# Patient Record
Sex: MALE | Race: WHITE | NOT HISPANIC OR LATINO | Employment: UNEMPLOYED | URBAN - NONMETROPOLITAN AREA
[De-identification: names, ages, dates, MRNs, and addresses within clinical notes are randomized per-mention and may not be internally consistent; named-entity substitution may affect disease eponyms.]

---

## 2023-04-16 ENCOUNTER — APPOINTMENT (OUTPATIENT)
Dept: GENERAL RADIOLOGY | Facility: HOSPITAL | Age: 68
End: 2023-04-16
Payer: MEDICARE

## 2023-04-16 ENCOUNTER — HOSPITAL ENCOUNTER (EMERGENCY)
Facility: HOSPITAL | Age: 68
Discharge: HOME | End: 2023-04-16
Attending: EMERGENCY MEDICINE | Admitting: EMERGENCY MEDICINE
Payer: MEDICARE

## 2023-04-16 VITALS
TEMPERATURE: 98.2 F | WEIGHT: 180 LBS | HEART RATE: 78 BPM | HEIGHT: 67 IN | OXYGEN SATURATION: 100 % | RESPIRATION RATE: 18 BRPM | SYSTOLIC BLOOD PRESSURE: 127 MMHG | DIASTOLIC BLOOD PRESSURE: 74 MMHG | BODY MASS INDEX: 28.25 KG/M2

## 2023-04-16 DIAGNOSIS — L03.119 CELLULITIS OF FOOT: Primary | ICD-10-CM

## 2023-04-16 DIAGNOSIS — R21 GROIN RASH: ICD-10-CM

## 2023-04-16 LAB
ALBUMIN SERPL-MCNC: 4.3 G/DL (ref 3.5–5.2)
ALBUMIN/GLOB SERPL: 1.5 G/DL
ALP SERPL-CCNC: 119 U/L (ref 39–117)
ALT SERPL W P-5'-P-CCNC: 24 U/L (ref 1–41)
ANION GAP SERPL CALCULATED.3IONS-SCNC: 11.1 MMOL/L (ref 5–15)
AST SERPL-CCNC: 22 U/L (ref 1–40)
BASOPHILS # BLD AUTO: 0.08 10*3/MM3 (ref 0–0.2)
BASOPHILS NFR BLD AUTO: 1.1 % (ref 0–1.5)
BILIRUB SERPL-MCNC: 0.2 MG/DL (ref 0–1.2)
BUN SERPL-MCNC: 10 MG/DL (ref 8–23)
BUN/CREAT SERPL: 8.4 (ref 7–25)
CALCIUM SPEC-SCNC: 9.7 MG/DL (ref 8.6–10.5)
CHLORIDE SERPL-SCNC: 106 MMOL/L (ref 98–107)
CO2 SERPL-SCNC: 25.9 MMOL/L (ref 22–29)
CREAT SERPL-MCNC: 1.19 MG/DL (ref 0.76–1.27)
D DIMER PPP FEU-MCNC: 0.4 MCGFEU/ML (ref 0–0.65)
DEPRECATED RDW RBC AUTO: 40.9 FL (ref 37–54)
EGFRCR SERPLBLD CKD-EPI 2021: 67.8 ML/MIN/1.73
EOSINOPHIL # BLD AUTO: 0.32 10*3/MM3 (ref 0–0.4)
EOSINOPHIL NFR BLD AUTO: 4.5 % (ref 0.3–6.2)
ERYTHROCYTE [DISTWIDTH] IN BLOOD BY AUTOMATED COUNT: 13.1 % (ref 12.3–15.4)
GEN 5 2HR TROPONIN T REFLEX: 6 NG/L
GLOBULIN UR ELPH-MCNC: 2.9 GM/DL
GLUCOSE SERPL-MCNC: 119 MG/DL (ref 65–99)
HCT VFR BLD AUTO: 46.9 % (ref 37.5–51)
HGB BLD-MCNC: 15.5 G/DL (ref 13–17.7)
HOLD SPECIMEN: NORMAL
HOLD SPECIMEN: NORMAL
IMM GRANULOCYTES # BLD AUTO: 0.02 10*3/MM3 (ref 0–0.05)
IMM GRANULOCYTES NFR BLD AUTO: 0.3 % (ref 0–0.5)
LYMPHOCYTES # BLD AUTO: 2.19 10*3/MM3 (ref 0.7–3.1)
LYMPHOCYTES NFR BLD AUTO: 30.7 % (ref 19.6–45.3)
MAGNESIUM SERPL-MCNC: 2.2 MG/DL (ref 1.6–2.4)
MCH RBC QN AUTO: 28.7 PG (ref 26.6–33)
MCHC RBC AUTO-ENTMCNC: 33 G/DL (ref 31.5–35.7)
MCV RBC AUTO: 86.7 FL (ref 79–97)
MONOCYTES # BLD AUTO: 0.47 10*3/MM3 (ref 0.1–0.9)
MONOCYTES NFR BLD AUTO: 6.6 % (ref 5–12)
NEUTROPHILS NFR BLD AUTO: 4.06 10*3/MM3 (ref 1.7–7)
NEUTROPHILS NFR BLD AUTO: 56.8 % (ref 42.7–76)
NRBC BLD AUTO-RTO: 0 /100 WBC (ref 0–0.2)
PLATELET # BLD AUTO: 324 10*3/MM3 (ref 140–450)
PMV BLD AUTO: 9.4 FL (ref 6–12)
POTASSIUM SERPL-SCNC: 4.4 MMOL/L (ref 3.5–5.2)
PROT SERPL-MCNC: 7.2 G/DL (ref 6–8.5)
RBC # BLD AUTO: 5.41 10*6/MM3 (ref 4.14–5.8)
SODIUM SERPL-SCNC: 143 MMOL/L (ref 136–145)
T4 FREE SERPL-MCNC: 1.05 NG/DL (ref 0.93–1.7)
TROPONIN T DELTA: NORMAL
TROPONIN T SERPL HS-MCNC: <6 NG/L
TSH SERPL DL<=0.05 MIU/L-ACNC: 1.79 UIU/ML (ref 0.27–4.2)
WBC NRBC COR # BLD: 7.14 10*3/MM3 (ref 3.4–10.8)
WHOLE BLOOD HOLD COAG: NORMAL
WHOLE BLOOD HOLD SPECIMEN: NORMAL

## 2023-04-16 PROCEDURE — 99284 EMERGENCY DEPT VISIT MOD MDM: CPT

## 2023-04-16 PROCEDURE — 83735 ASSAY OF MAGNESIUM: CPT | Performed by: EMERGENCY MEDICINE

## 2023-04-16 PROCEDURE — 71045 X-RAY EXAM CHEST 1 VIEW: CPT

## 2023-04-16 PROCEDURE — 85025 COMPLETE CBC W/AUTO DIFF WBC: CPT | Performed by: EMERGENCY MEDICINE

## 2023-04-16 PROCEDURE — 84439 ASSAY OF FREE THYROXINE: CPT | Performed by: EMERGENCY MEDICINE

## 2023-04-16 PROCEDURE — 25010000002 DIPHENHYDRAMINE PER 50 MG: Performed by: EMERGENCY MEDICINE

## 2023-04-16 PROCEDURE — 84484 ASSAY OF TROPONIN QUANT: CPT | Performed by: EMERGENCY MEDICINE

## 2023-04-16 PROCEDURE — 93005 ELECTROCARDIOGRAM TRACING: CPT | Performed by: EMERGENCY MEDICINE

## 2023-04-16 PROCEDURE — 96374 THER/PROPH/DIAG INJ IV PUSH: CPT

## 2023-04-16 PROCEDURE — 84443 ASSAY THYROID STIM HORMONE: CPT | Performed by: EMERGENCY MEDICINE

## 2023-04-16 PROCEDURE — 80053 COMPREHEN METABOLIC PANEL: CPT | Performed by: EMERGENCY MEDICINE

## 2023-04-16 PROCEDURE — 96375 TX/PRO/DX INJ NEW DRUG ADDON: CPT

## 2023-04-16 PROCEDURE — 25010000002 KETOROLAC TROMETHAMINE PER 15 MG: Performed by: EMERGENCY MEDICINE

## 2023-04-16 PROCEDURE — 85379 FIBRIN DEGRADATION QUANT: CPT | Performed by: EMERGENCY MEDICINE

## 2023-04-16 PROCEDURE — 25010000002 METHYLPREDNISOLONE PER 125 MG: Performed by: EMERGENCY MEDICINE

## 2023-04-16 PROCEDURE — 36415 COLL VENOUS BLD VENIPUNCTURE: CPT

## 2023-04-16 RX ORDER — METHYLPREDNISOLONE 4 MG/1
TABLET ORAL
Qty: 21 TABLET | Refills: 0 | Status: SHIPPED | OUTPATIENT
Start: 2023-04-16

## 2023-04-16 RX ORDER — DIPHENHYDRAMINE HYDROCHLORIDE 50 MG/ML
25 INJECTION INTRAMUSCULAR; INTRAVENOUS ONCE
Status: COMPLETED | OUTPATIENT
Start: 2023-04-16 | End: 2023-04-16

## 2023-04-16 RX ORDER — DOXYCYCLINE 100 MG/1
100 CAPSULE ORAL 2 TIMES DAILY
Qty: 20 CAPSULE | Refills: 0 | Status: SHIPPED | OUTPATIENT
Start: 2023-04-16 | End: 2023-04-26

## 2023-04-16 RX ORDER — SODIUM CHLORIDE 0.9 % (FLUSH) 0.9 %
10 SYRINGE (ML) INJECTION AS NEEDED
Status: DISCONTINUED | OUTPATIENT
Start: 2023-04-16 | End: 2023-04-16 | Stop reason: HOSPADM

## 2023-04-16 RX ORDER — KETOROLAC TROMETHAMINE 30 MG/ML
30 INJECTION, SOLUTION INTRAMUSCULAR; INTRAVENOUS ONCE
Status: COMPLETED | OUTPATIENT
Start: 2023-04-16 | End: 2023-04-16

## 2023-04-16 RX ORDER — METHYLPREDNISOLONE SODIUM SUCCINATE 125 MG/2ML
125 INJECTION, POWDER, LYOPHILIZED, FOR SOLUTION INTRAMUSCULAR; INTRAVENOUS ONCE
Status: COMPLETED | OUTPATIENT
Start: 2023-04-16 | End: 2023-04-16

## 2023-04-16 RX ADMIN — DIPHENHYDRAMINE HYDROCHLORIDE 25 MG: 50 INJECTION INTRAMUSCULAR; INTRAVENOUS at 17:59

## 2023-04-16 RX ADMIN — SODIUM CHLORIDE 1000 ML: 9 INJECTION, SOLUTION INTRAVENOUS at 16:21

## 2023-04-16 RX ADMIN — METHYLPREDNISOLONE SODIUM SUCCINATE 125 MG: 125 INJECTION, POWDER, FOR SOLUTION INTRAMUSCULAR; INTRAVENOUS at 17:59

## 2023-04-16 RX ADMIN — KETOROLAC TROMETHAMINE 30 MG: 30 INJECTION, SOLUTION INTRAMUSCULAR; INTRAVENOUS at 16:21

## 2023-04-17 LAB
QT INTERVAL: 368 MS
QTC INTERVAL: 408 MS

## 2023-04-17 NOTE — ED PROVIDER NOTES
Subjective     History provided by:  Patient   used: No    Chest Pain  Pain location:  L lateral chest and L chest  Pain quality: aching and dull    Pain radiates to:  Does not radiate  Pain severity:  Mild  Onset quality:  Gradual  Timing:  Constant  Progression:  Unchanged  Chronicity:  New  Context: breathing, lifting, movement and raising an arm    Context: not drug use, not eating, not intercourse, not at rest, not stress and not trauma    Relieved by:  Nothing  Worsened by:  Nothing  Ineffective treatments:  None tried  Associated symptoms: no abdominal pain, no AICD problem, no altered mental status, no anorexia, no anxiety, no back pain, no claudication, no cough, no diaphoresis, no dizziness, no dysphagia, no fatigue, no fever, no headache, no heartburn, no lower extremity edema, no nausea, no near-syncope, no numbness, no orthopnea, no palpitations, no PND, no shortness of breath, no syncope, no vomiting and no weakness    Risk factors: male sex    Risk factors: no aortic disease, no birth control, no coronary artery disease, no diabetes mellitus, no Андрей-Danlos syndrome, no high cholesterol, no hypertension, no immobilization, no Marfan's syndrome, not obese, no prior DVT/PE, no smoking and no surgery        Review of Systems   Constitutional: Negative for activity change, appetite change, chills, diaphoresis, fatigue and fever.   HENT: Negative for congestion, ear pain, sore throat and trouble swallowing.    Eyes: Negative for redness.   Respiratory: Negative for cough, chest tightness, shortness of breath and wheezing.    Cardiovascular: Positive for chest pain. Negative for palpitations, orthopnea, claudication, leg swelling, syncope, PND and near-syncope.   Gastrointestinal: Negative for abdominal pain, anorexia, diarrhea, heartburn, nausea and vomiting.   Genitourinary: Negative for dysuria and urgency.   Musculoskeletal: Negative for arthralgias, back pain, myalgias and neck  pain.   Skin: Positive for rash. Negative for pallor and wound.   Neurological: Negative for dizziness, speech difficulty, weakness, numbness and headaches.   Psychiatric/Behavioral: Negative for agitation, behavioral problems, confusion and decreased concentration.   All other systems reviewed and are negative.      History reviewed. No pertinent past medical history.    Allergies   Allergen Reactions   • Sulfa Antibiotics Hives       History reviewed. No pertinent surgical history.    History reviewed. No pertinent family history.    Social History     Socioeconomic History   • Marital status: Unknown           Objective   Physical Exam  Vitals and nursing note reviewed.   Constitutional:       General: He is not in acute distress.     Appearance: Normal appearance. He is well-developed. He is not toxic-appearing or diaphoretic.   HENT:      Head: Normocephalic and atraumatic.      Right Ear: External ear normal.      Left Ear: External ear normal.      Nose: Nose normal.      Mouth/Throat:      Pharynx: No oropharyngeal exudate.      Tonsils: No tonsillar exudate.   Eyes:      General: Lids are normal.      Conjunctiva/sclera: Conjunctivae normal.      Pupils: Pupils are equal, round, and reactive to light.   Neck:      Thyroid: No thyromegaly.   Cardiovascular:      Rate and Rhythm: Normal rate and regular rhythm.      Pulses: Normal pulses.      Heart sounds: Normal heart sounds, S1 normal and S2 normal.   Pulmonary:      Effort: Pulmonary effort is normal. No tachypnea or respiratory distress.      Breath sounds: Normal breath sounds. No decreased breath sounds, wheezing or rales.   Chest:      Chest wall: Tenderness present.       Abdominal:      General: Bowel sounds are normal. There is no distension.      Palpations: Abdomen is soft.      Tenderness: There is no abdominal tenderness. There is no guarding or rebound.   Musculoskeletal:         General: No tenderness or deformity. Normal range of motion.       Cervical back: Full passive range of motion without pain, normal range of motion and neck supple.   Lymphadenopathy:      Cervical: No cervical adenopathy.   Skin:     General: Skin is warm and dry.      Coloration: Skin is not pale.      Findings: Rash present. No erythema. Rash is urticarial.          Neurological:      Mental Status: He is alert and oriented to person, place, and time.      GCS: GCS eye subscore is 4. GCS verbal subscore is 5. GCS motor subscore is 6.      Cranial Nerves: No cranial nerve deficit.      Sensory: No sensory deficit.   Psychiatric:         Speech: Speech normal.         Behavior: Behavior normal.         Thought Content: Thought content normal.         Judgment: Judgment normal.         Procedures           ED Course  ED Course as of 04/17/23 0125   Sun Apr 16, 2023   1722 ECG 12 Lead Chest Pain  Vent. Rate :  74 BPM     Atrial Rate :  74 BPM     P-R Int : 146 ms          QRS Dur :  80 ms      QT Int : 368 ms       P-R-T Axes :  42  45  44 degrees     QTc Int : 408 ms     Normal sinus rhythm  Normal ECG  No previous ECGs available [ES]   1819 XR Chest 1 View  IMPRESSION:  No acute cardiopulmonary findings. [ES]   Mon Apr 17, 2023   0125 Heart Score: 3 [ES]      ED Course User Index  [ES] Sin De La Garza MD                                           Medical Decision Making    History provided by:  Patient   used: No    Chest Pain  Pain location:  L lateral chest and L chest  Pain quality: aching and dull    Pain radiates to:  Does not radiate  Pain severity:  Mild  Onset quality:  Gradual  Timing:  Constant  Progression:  Unchanged  Chronicity:  New  Context: breathing, lifting, movement and raising an arm    Context: not drug use, not eating, not intercourse, not at rest, not stress and not trauma    Relieved by:  Nothing  Worsened by:  Nothing  Ineffective treatments:  None tried  Associated symptoms: no abdominal pain, no AICD problem, no altered  mental status, no anorexia, no anxiety, no back pain, no claudication, no cough, no diaphoresis, no dizziness, no dysphagia, no fatigue, no fever, no headache, no heartburn, no lower extremity edema, no nausea, no near-syncope, no numbness, no orthopnea, no palpitations, no PND, no shortness of breath, no syncope, no vomiting and no weakness    Risk factors: male sex    Risk factors: no aortic disease, no birth control, no coronary artery disease, no diabetes mellitus, no Андрей-Danlos syndrome, no high cholesterol, no hypertension, no immobilization, no Marfan's syndrome, not obese, no prior DVT/PE, no smoking and no surgery        Cellulitis of foot: complicated acute illness or injury  Groin rash: complicated acute illness or injury  Amount and/or Complexity of Data Reviewed  External Data Reviewed: labs, radiology, ECG and notes.  Labs: ordered. Decision-making details documented in ED Course.  Radiology: ordered and independent interpretation performed. Decision-making details documented in ED Course.  ECG/medicine tests: ordered and independent interpretation performed. Decision-making details documented in ED Course.      Risk  Prescription drug management.          Final diagnoses:   Cellulitis of foot   Groin rash       ED Disposition  ED Disposition     ED Disposition   Discharge    Condition   Stable    Comment   --             PATIENT CONNECTION - [place]  See Provider List  [facility] [address]  [phone]  Schedule an appointment as soon as possible for a visit in 1 day  EVALUATE         Medication List      New Prescriptions    doxycycline 100 MG capsule  Commonly known as: MONODOX  Take 1 capsule by mouth 2 (Two) Times a Day for 10 days.     methylPREDNISolone 4 MG dose pack  Commonly known as: MEDROL  Take as directed on package instructions.           Where to Get Your Medications      These medications were sent to [facility] [address].  [phone]  -  [phone] FX  [address] [facility] [address]    Phone: [phone]   · doxycycline 100 MG capsule  · methylPREDNISolone 4 MG dose pack          Sin De La Garza MD  04/17/23 5977

## 2023-10-09 ENCOUNTER — OFFICE VISIT (OUTPATIENT)
Dept: UROLOGY | Facility: CLINIC | Age: 68
End: 2023-10-09
Payer: MEDICARE

## 2023-10-09 VITALS
BODY MASS INDEX: 27.56 KG/M2 | WEIGHT: 175.6 LBS | HEART RATE: 109 BPM | DIASTOLIC BLOOD PRESSURE: 73 MMHG | SYSTOLIC BLOOD PRESSURE: 119 MMHG | HEIGHT: 67 IN

## 2023-10-09 DIAGNOSIS — R39.9 LOWER URINARY TRACT SYMPTOMS (LUTS): ICD-10-CM

## 2023-10-09 DIAGNOSIS — R97.20 ELEVATED PROSTATE SPECIFIC ANTIGEN (PSA): ICD-10-CM

## 2023-10-09 DIAGNOSIS — N52.01 ERECTILE DYSFUNCTION DUE TO ARTERIAL INSUFFICIENCY: Primary | ICD-10-CM

## 2023-10-09 PROCEDURE — 1160F RVW MEDS BY RX/DR IN RCRD: CPT

## 2023-10-09 PROCEDURE — 84154 ASSAY OF PSA FREE: CPT

## 2023-10-09 PROCEDURE — 1159F MED LIST DOCD IN RCRD: CPT

## 2023-10-09 PROCEDURE — 84153 ASSAY OF PSA TOTAL: CPT

## 2023-10-09 PROCEDURE — 99203 OFFICE O/P NEW LOW 30 MIN: CPT

## 2023-10-09 RX ORDER — MELOXICAM 15 MG/1
15 TABLET ORAL DAILY
COMMUNITY
Start: 2023-08-05

## 2023-10-09 RX ORDER — TADALAFIL 5 MG/1
5 TABLET ORAL DAILY
Qty: 30 TABLET | Refills: 1 | Status: SHIPPED | OUTPATIENT
Start: 2023-10-09

## 2023-10-09 NOTE — PROGRESS NOTES
"Chief Complaint:    Chief Complaint   Patient presents with    Elevated PSA     New patient       Vital Signs:   /73   Pulse 109   Ht 170.2 cm (67.01\")   Wt 79.7 kg (175 lb 9.6 oz)   BMI 27.50 kg/mý   Body mass index is 27.5 kg/mý.      HPI:  Shreyas Quiñones is a 67 y.o. male who presents today for initial evaluation     History of Present Illness  Mr. Sharpe presents to the clinic for evaluation of elevated PSA.  He has a past medical history significant for arthritis for which she takes methylprednisolone and meloxicam as needed.  Patient had a recent PSA taken on 9/26/2023 that came back high at 4.5.  He denies any elevated PSA prior to this.  He is unsure if his PSA was ever checked.  He denies any family history of prostate cancer.  He has an IPSS score of 8.  He gets up roughly 1-2 times throughout the night.  He does endorse a weak stream with urgency.  Denies any incontinence.  Digital rectal exam today the patient's prostate is firm to palpation with no nodules or bumps noted.  No tenderness.  Does also endorse difficulty maintaining obtaining erections.      Past Medical History:  No past medical history on file.    Current Meds:  Current Outpatient Medications   Medication Sig Dispense Refill    meloxicam (MOBIC) 15 MG tablet Take 1 tablet by mouth Daily.      methylPREDNISolone (MEDROL) 4 MG dose pack Take as directed on package instructions. 21 tablet 0    tadalafil (CIALIS) 5 MG tablet Take 1 tablet by mouth Daily. 30 tablet 1     No current facility-administered medications for this visit.        Allergies:   Allergies   Allergen Reactions    Sulfa Antibiotics Hives        Past Surgical History:  No past surgical history on file.    Social History:  Social History     Socioeconomic History    Marital status: Unknown   Tobacco Use    Smoking status: Former     Types: Cigars   Vaping Use    Vaping Use: Never used       Family History:  No family history on file.    Review of " Systems:  Review of Systems   Constitutional:  Positive for fatigue. Negative for fever and unexpected weight change.   Respiratory:  Negative for chest tightness and shortness of breath.    Cardiovascular:  Negative for chest pain.   Gastrointestinal:  Negative for abdominal pain, constipation, diarrhea, nausea and vomiting.   Genitourinary:  Positive for difficulty urinating and urgency. Negative for dysuria and frequency.   Musculoskeletal:  Negative for back pain.   Skin: Negative.  Negative for rash.   Psychiatric/Behavioral:  Negative for confusion and suicidal ideas.        Physical Exam:  Physical Exam  Constitutional:       General: He is not in acute distress.     Appearance: Normal appearance.   HENT:      Head: Normocephalic and atraumatic.      Nose: Nose normal.      Mouth/Throat:      Mouth: Mucous membranes are moist.   Eyes:      Conjunctiva/sclera: Conjunctivae normal.   Cardiovascular:      Rate and Rhythm: Normal rate and regular rhythm.      Pulses: Normal pulses.      Heart sounds: Normal heart sounds.   Pulmonary:      Effort: Pulmonary effort is normal.      Breath sounds: Normal breath sounds.   Abdominal:      General: Bowel sounds are normal.      Palpations: Abdomen is soft.      Tenderness: There is no right CVA tenderness or left CVA tenderness.   Genitourinary:     Comments: Firm prostate to palpation with no signs of tenderness, bogginess, or nodules.  Musculoskeletal:         General: Normal range of motion.      Cervical back: Normal range of motion.   Skin:     General: Skin is warm.   Neurological:      General: No focal deficit present.      Mental Status: He is alert and oriented to person, place, and time.   Psychiatric:         Mood and Affect: Mood normal.         Behavior: Behavior normal.         Thought Content: Thought content normal.         Judgment: Judgment normal.         IPSS Questionnaire (AUA-7):  IPSS Questionnaire (AUA-7):                  IPSS Questionnaire  (AUA-7):  Over the past month.    1)  Incomplete Emptying  How often have you had a sensation of not emptying your bladder?  0 - Not at all   2)  Frequency  How often have you had to urinate less than every two hours? 0 - Not at all   3)  Intermittency  How often have you found you stopped and started again several times when you urinated?  0 - Not at all   4) Urgency  How often have you found it difficult to postpone urination?  3 - About half the time   5) Weak Stream  How often have you had a weak urinary stream?  4 - More than half the time   6) Straining  How often have you had to push or strain to begin urination?  0 - Not at all   7) Nocturia  How many times did you typically get up at night to urinate?  1 - 1 time   Total Score:  8   The International Prostate Symptom Score (IPSS) is used to screen, diagnose, track symptoms of benign prostatic hyperplasia (BPH).    0-7 pts (Mild Symptoms)  / 8-19 pts (Moderate) / 20-35 (Severe)    Quality of life due to urinary symptoms:  If you were to spend the rest of your life with your urinary condition the way it is now, how would you feel about that? 2-Mostly Satisfied   Urine Leakage (Incontinence) 0-No Leakage       Recent Image (CT and/or KUB):   CT Abdomen and Pelvis: No results found for this or any previous visit.     CT Stone Protocol: No results found for this or any previous visit.     KUB: No results found for this or any previous visit.       Labs:  Brief Urine Lab Results       None          No visits with results within 3 Month(s) from this visit.   Latest known visit with results is:   Admission on 04/16/2023, Discharged on 04/16/2023   Component Date Value Ref Range Status    QT Interval 04/16/2023 368  ms Final    QTC Interval 04/16/2023 408  ms Final    Glucose 04/16/2023 119 (H)  65 - 99 mg/dL Final    BUN 04/16/2023 10  8 - 23 mg/dL Final    Creatinine 04/16/2023 1.19  0.76 - 1.27 mg/dL Final    Sodium 04/16/2023 143  136 - 145 mmol/L Final     Potassium 04/16/2023 4.4  3.5 - 5.2 mmol/L Final    Slight hemolysis detected by analyzer. Results may be affected.    Chloride 04/16/2023 106  98 - 107 mmol/L Final    CO2 04/16/2023 25.9  22.0 - 29.0 mmol/L Final    Calcium 04/16/2023 9.7  8.6 - 10.5 mg/dL Final    Total Protein 04/16/2023 7.2  6.0 - 8.5 g/dL Final    Albumin 04/16/2023 4.3  3.5 - 5.2 g/dL Final    ALT (SGPT) 04/16/2023 24  1 - 41 U/L Final    AST (SGOT) 04/16/2023 22  1 - 40 U/L Final    Alkaline Phosphatase 04/16/2023 119 (H)  39 - 117 U/L Final    Total Bilirubin 04/16/2023 0.2  0.0 - 1.2 mg/dL Final    Globulin 04/16/2023 2.9  gm/dL Final    A/G Ratio 04/16/2023 1.5  g/dL Final    BUN/Creatinine Ratio 04/16/2023 8.4  7.0 - 25.0 Final    Anion Gap 04/16/2023 11.1  5.0 - 15.0 mmol/L Final    eGFR 04/16/2023 67.8  >60.0 mL/min/1.73 Final    HS Troponin T 04/16/2023 <6  <15 ng/L Final    Extra Tube 04/16/2023 Hold for add-ons.   Final    Auto resulted.    Extra Tube 04/16/2023 hold for add-on   Final    Auto resulted    Extra Tube 04/16/2023 Hold for add-ons.   Final    Auto resulted.    Extra Tube 04/16/2023 Hold for add-ons.   Final    Auto resulted    WBC 04/16/2023 7.14  3.40 - 10.80 10*3/mm3 Final    RBC 04/16/2023 5.41  4.14 - 5.80 10*6/mm3 Final    Hemoglobin 04/16/2023 15.5  13.0 - 17.7 g/dL Final    Hematocrit 04/16/2023 46.9  37.5 - 51.0 % Final    MCV 04/16/2023 86.7  79.0 - 97.0 fL Final    MCH 04/16/2023 28.7  26.6 - 33.0 pg Final    MCHC 04/16/2023 33.0  31.5 - 35.7 g/dL Final    RDW 04/16/2023 13.1  12.3 - 15.4 % Final    RDW-SD 04/16/2023 40.9  37.0 - 54.0 fl Final    MPV 04/16/2023 9.4  6.0 - 12.0 fL Final    Platelets 04/16/2023 324  140 - 450 10*3/mm3 Final    Neutrophil % 04/16/2023 56.8  42.7 - 76.0 % Final    Lymphocyte % 04/16/2023 30.7  19.6 - 45.3 % Final    Monocyte % 04/16/2023 6.6  5.0 - 12.0 % Final    Eosinophil % 04/16/2023 4.5  0.3 - 6.2 % Final    Basophil % 04/16/2023 1.1  0.0 - 1.5 % Final    Immature Grans %  04/16/2023 0.3  0.0 - 0.5 % Final    Neutrophils, Absolute 04/16/2023 4.06  1.70 - 7.00 10*3/mm3 Final    Lymphocytes, Absolute 04/16/2023 2.19  0.70 - 3.10 10*3/mm3 Final    Monocytes, Absolute 04/16/2023 0.47  0.10 - 0.90 10*3/mm3 Final    Eosinophils, Absolute 04/16/2023 0.32  0.00 - 0.40 10*3/mm3 Final    Basophils, Absolute 04/16/2023 0.08  0.00 - 0.20 10*3/mm3 Final    Immature Grans, Absolute 04/16/2023 0.02  0.00 - 0.05 10*3/mm3 Final    nRBC 04/16/2023 0.0  0.0 - 0.2 /100 WBC Final    D-Dimer, Quantitative 04/16/2023 0.40  0.00 - 0.65 MCGFEU/mL Final    Free T4 04/16/2023 1.05  0.93 - 1.70 ng/dL Final    TSH 04/16/2023 1.790  0.270 - 4.200 uIU/mL Final    Magnesium 04/16/2023 2.2  1.6 - 2.4 mg/dL Final    HS Troponin T 04/16/2023 6  <15 ng/L Final    Troponin T Delta 04/16/2023    Final    Unable to calculate.        Procedure: None  Procedures     I have reviewed and agree with the above PMH, PSH, FMH, social history, medications, allergies, and labs.     Assessment/Plan:   Problem List Items Addressed This Visit          Genitourinary and Reproductive     Erectile dysfunction due to arterial insufficiency - Primary    Relevant Medications    tadalafil (CIALIS) 5 MG tablet    Elevated prostate specific antigen (PSA)    Relevant Medications    tadalafil (CIALIS) 5 MG tablet    Other Relevant Orders    PSA Total+% Free (Serial)     Other Visit Diagnoses       Lower urinary tract symptoms (LUTS)        Relevant Medications    tadalafil (CIALIS) 5 MG tablet            Health Maintenance:   Health Maintenance Due   Topic Date Due    BMI FOLLOWUP  Never done    COLORECTAL CANCER SCREENING  Never done    COVID-19 Vaccine (1) Never done    TDAP/TD VACCINES (1 - Tdap) Never done    ZOSTER VACCINE (1 of 2) Never done    Pneumococcal Vaccine 65+ (1 - PCV) Never done    INFLUENZA VACCINE  Never done    HEPATITIS C SCREENING  Never done    ANNUAL WELLNESS VISIT  Never done        Smoking Counseling: Former smoker.   Never used smokeless tobacco.    Urine Incontinence: Patient reports that he is not currently experiencing any symptoms of urinary incontinence.    Patient was given instructions and counseling regarding his condition or for health maintenance advice. Please see specific information pulled into the AVS if appropriate.    Patient Education:   Elevated PSA/prostate enlargement: Discussed the pathophysiology of prostate enlargement and obstruction.  We discussed the static and dynamic effects of prostate enlargement as well as using 5 alpha reductase inhibitors versus alpha blockade.  We discussed the indications for transurethral surgery as well and/ or other therapeutic options available including all of the newer techniques.  Given patient's lower urinary tract symptoms recommend starting medication to help with symptoms.  Given patient's allergy to sulfa will avoid Flomax and start the patient on tadalafil 5 mg once daily to help with lower urinary tract symptoms and erections.  Discussed the risk and benefits of this medication with the patient.    PSA testing -given elevated PSA of 4.5 and recommending a repeat PSA free and total.  I discussed the pathophysiology of PSA testing indicating its use in the diagnosis and management of prostate cancer.  I discussed the normal range being 0 to 4, but more appropriately being much closer to 0 to 2 in a normal male.  I discussed the fact that after a certain age it is against recommendation to use PSA testing especially in view of numerous comorbidities.  I discussed many of the things that can artificially raise PSA including put not limited to a recent infection, urinary tract infection, recent sexual intercourse, or even the type of movement such as manipulation of the prostate from riding a bicycle.  It was discussed that the most important use of PSA is the velocity measurement.  This refers to the change of PSA with time. I discussed that we look for greater than  20% rise over a year to help us make the prediction of prostate cancer.  I also discussed that in the case of prostate cancer indicating a radical prostatectomy, the PSA should be 0 and any rise indicates an early biochemical recurrence.  I will call patient with results once obtained.  I did discuss with him the use of an MRI versus biopsy for further evaluation.  Patient verbalized understanding.  Erectile dysfunction -discussed with the patient the pathophysiology of erectile dysfunction.  I explained to the patient that erectile dysfunction is a symptom and no disorder.  I educated the patient that erectile dysfunction is often secondary to significant arterial insufficiency, diabetes mellitus, medications, trauma, low testosterone, or psychological factors.  I discussed with the patient ways to help treat erectile dysfunction which include but are not limited to medications, mechanical devices, penile injections, and penile implants.  Given current symptoms and recommending tadalafil 5 mg once daily.  Discussed the risk and benefits of this medication.  I will call patient with PSA results once obtained.  We will see him back pending this.    Visit Diagnoses:    ICD-10-CM ICD-9-CM   1. Erectile dysfunction due to arterial insufficiency  N52.01 607.84   2. Elevated prostate specific antigen (PSA)  R97.20 790.93   3. Lower urinary tract symptoms (LUTS)  R39.9 788.99       Meds Ordered During Visit:  New Medications Ordered This Visit   Medications    tadalafil (CIALIS) 5 MG tablet     Sig: Take 1 tablet by mouth Daily.     Dispense:  30 tablet     Refill:  1       Follow Up Appointment: Pending PSA results.  No follow-ups on file.      This document has been electronically signed by Bossman Amato PA-C   October 9, 2023 15:50 EDT    Part of this note may be an electronic transcription/translation of spoken language to printed text using the Dragon Dictation System.

## 2023-10-11 LAB
PSA FREE MFR SERPL: 7.6 %
PSA FREE SERPL-MCNC: 0.34 NG/ML
PSA SERPL-MCNC: 4.5 NG/ML (ref 0–4)

## 2023-10-12 ENCOUNTER — TELEPHONE (OUTPATIENT)
Dept: UROLOGY | Facility: CLINIC | Age: 68
End: 2023-10-12
Payer: MEDICARE

## 2023-10-12 DIAGNOSIS — R97.20 ELEVATED PROSTATE SPECIFIC ANTIGEN (PSA): Primary | ICD-10-CM

## 2023-10-12 NOTE — TELEPHONE ENCOUNTER
Called patient to advise him of PSA results.  He did not answer so I left a voicemail.  Advised him that PSA remains unchanged at 4.5.  His free percentage was relatively low at roughly 7-1/2.  Recommending an MRI or prostate biopsy.  Advised patient to call back with how he would like to proceed forward.

## 2023-10-18 ENCOUNTER — HOSPITAL ENCOUNTER (OUTPATIENT)
Dept: MRI IMAGING | Facility: HOSPITAL | Age: 68
Discharge: HOME | End: 2023-10-18
Payer: MEDICARE

## 2023-10-18 DIAGNOSIS — R97.20 ELEVATED PROSTATE SPECIFIC ANTIGEN (PSA): ICD-10-CM

## 2023-10-18 LAB — CREAT BLDA-MCNC: 0.9 MG/DL (ref 0.6–1.3)

## 2023-10-18 PROCEDURE — 82565 ASSAY OF CREATININE: CPT

## 2023-10-18 PROCEDURE — A9577 INJ MULTIHANCE: HCPCS

## 2023-10-18 PROCEDURE — 0 GADOBENATE DIMEGLUMINE 529 MG/ML SOLUTION

## 2023-10-18 PROCEDURE — 72197 MRI PELVIS W/O & W/DYE: CPT

## 2023-10-18 RX ADMIN — GADOBENATE DIMEGLUMINE 16 ML: 529 INJECTION, SOLUTION INTRAVENOUS at 09:20

## 2023-10-20 ENCOUNTER — TELEPHONE (OUTPATIENT)
Dept: UROLOGY | Facility: CLINIC | Age: 68
End: 2023-10-20
Payer: MEDICARE

## 2023-10-20 NOTE — TELEPHONE ENCOUNTER
Tried to call patient about the MRI results.  He did not answer so left a voicemail.  Advised him that he had PI-RADS 2 which is very low risk for clinically significant prostate cancer.  I did recommend repeat PSA in 6 months.  I will have him scheduled appropriately.  Advised him to call back with any questions or concerns.

## 2024-05-03 ENCOUNTER — OFFICE VISIT (OUTPATIENT)
Dept: UROLOGY | Facility: CLINIC | Age: 69
End: 2024-05-03
Payer: MEDICARE

## 2024-05-03 VITALS
HEART RATE: 69 BPM | SYSTOLIC BLOOD PRESSURE: 111 MMHG | HEIGHT: 67 IN | BODY MASS INDEX: 27.06 KG/M2 | DIASTOLIC BLOOD PRESSURE: 67 MMHG | WEIGHT: 172.4 LBS

## 2024-05-03 DIAGNOSIS — J34.89 SINUS PRESSURE: ICD-10-CM

## 2024-05-03 DIAGNOSIS — N52.01 ERECTILE DYSFUNCTION DUE TO ARTERIAL INSUFFICIENCY: ICD-10-CM

## 2024-05-03 DIAGNOSIS — R97.20 ELEVATED PROSTATE SPECIFIC ANTIGEN (PSA): Primary | ICD-10-CM

## 2024-05-03 PROCEDURE — 84154 ASSAY OF PSA FREE: CPT

## 2024-05-03 PROCEDURE — 84153 ASSAY OF PSA TOTAL: CPT

## 2024-05-03 RX ORDER — TADALAFIL 10 MG/1
10 TABLET ORAL AS NEEDED
Qty: 20 TABLET | Refills: 1 | Status: SHIPPED | OUTPATIENT
Start: 2024-05-03

## 2024-05-03 RX ORDER — PREDNISONE 10 MG/1
TABLET ORAL
Qty: 15 TABLET | Refills: 0 | Status: SHIPPED | OUTPATIENT
Start: 2024-05-03

## 2024-05-03 NOTE — PROGRESS NOTES
"Chief Complaint:    Chief Complaint   Patient presents with    Elevated PSA     Follow up     Erectile Dysfunction       Vital Signs:   /67   Pulse 69   Ht 170.2 cm (67.01\")   Wt 78.2 kg (172 lb 6.4 oz)   BMI 26.99 kg/m²   Body mass index is 26.99 kg/m².      HPI:  Shreyas Quiñones is a 68 y.o. male who presents today for follow up    History of Present Illness  Mr. Sharpe presents to the clinic for a 6-month follow-up for elevated PSA.  He was initially seen in office for an elevated PSA of roughly 4.5.  He had a repeat PSA free and total that came back with an unchanged total of 4.5 and low free percentage of roughly 7%.  This did correlate with a roughly 50% risk for prostate carcinoma and we will proceed forward with an MRI with and without contrast.  Patient's MRI revealed a PI-RADS 2 with low clinical significance for prostate carcinoma.  We recommended observation with a repeat PSA in roughly 6 months.  Patient was started on tadalafil 5 mg daily at last office visit for lower urinary tract symptoms and erectile dysfunction.  He has not been taking medication routinely.  Currently only has an IPSS score of 6.  He states he gets up roughly 1 time throughout the night and endorses a weak stream with intermittency and some sensations of incomplete bladder emptying.  He does endorse a decreased sex drive and difficulty maintaining obtaining erections.  He denies any fever, chills, or unexplained weight loss.  Does complain of some chronic sinus pressure and yellow drainage.  He has tried over-the-counter medications and prescribed allergy medication with no improvement.  Erectile Dysfunction  He reports no anxiety. Irritative symptoms include frequency and urgency. Pertinent negatives include no chills, dysuria or hematuria.       Past Medical History:  History reviewed. No pertinent past medical history.    Current Meds:  Current Outpatient Medications   Medication Sig Dispense Refill    meloxicam " (MOBIC) 15 MG tablet Take 1 tablet by mouth Daily.      methylPREDNISolone (MEDROL) 4 MG dose pack Take as directed on package instructions. 21 tablet 0    predniSONE (DELTASONE) 10 MG tablet Take 5 tablets by mouth on day one, then 4 tablets on day two, 3 tablets on day three, 2 tablets on day four, then 1 tablet by mouth on day five. 15 tablet 0    tadalafil (Cialis) 10 MG tablet Take 1 tablet by mouth As Needed for Erectile Dysfunction. 20 tablet 1     No current facility-administered medications for this visit.        Allergies:   Allergies   Allergen Reactions    Sulfa Antibiotics Hives        Past Surgical History:  Past Surgical History:   Procedure Laterality Date    SHOULDER SURGERY         Social History:  Social History     Socioeconomic History    Marital status:    Tobacco Use    Smoking status: Former     Types: Cigars    Smokeless tobacco: Never   Vaping Use    Vaping status: Never Used   Substance and Sexual Activity    Alcohol use: Never    Drug use: Never    Sexual activity: Defer       Family History:  Family History   Problem Relation Age of Onset    Prostate cancer Father     Hypertension Mother     Heart disease Mother        Review of Systems:  Review of Systems   Constitutional:  Positive for fatigue. Negative for chills, fever and unexpected weight change.   HENT:  Positive for congestion and sinus pressure.    Respiratory:  Negative for chest tightness and shortness of breath.    Cardiovascular:  Negative for chest pain.   Gastrointestinal:  Negative for abdominal pain, constipation, diarrhea, nausea and vomiting.   Genitourinary:  Positive for frequency and urgency. Negative for difficulty urinating, dysuria, flank pain, hematuria, penile pain, penile swelling, scrotal swelling and testicular pain.   Musculoskeletal:  Negative for back pain and neck pain.   Skin:  Negative for rash.   Neurological:  Negative for dizziness and headaches.   Hematological:  Does not bruise/bleed  easily.   Psychiatric/Behavioral:  Negative for confusion and suicidal ideas. The patient is not nervous/anxious.        Physical Exam:  Physical Exam  Constitutional:       General: He is not in acute distress.     Appearance: Normal appearance.   HENT:      Head: Normocephalic and atraumatic.      Nose: Nose normal.      Mouth/Throat:      Mouth: Mucous membranes are moist.   Eyes:      Conjunctiva/sclera: Conjunctivae normal.   Cardiovascular:      Rate and Rhythm: Normal rate and regular rhythm.      Pulses: Normal pulses.      Heart sounds: Normal heart sounds.   Pulmonary:      Effort: Pulmonary effort is normal.      Breath sounds: Normal breath sounds.   Abdominal:      General: Bowel sounds are normal.      Palpations: Abdomen is soft.   Musculoskeletal:         General: Normal range of motion.      Cervical back: Normal range of motion.   Skin:     General: Skin is warm.   Neurological:      General: No focal deficit present.      Mental Status: He is alert and oriented to person, place, and time.   Psychiatric:         Mood and Affect: Mood normal.         Behavior: Behavior normal.         Thought Content: Thought content normal.         Judgment: Judgment normal.         IPSS Questionnaire (AUA-7):  IPSS Questionnaire (AUA-7):                  IPSS Questionnaire (AUA-7):  Over the past month…    1)  Incomplete Emptying  How often have you had a sensation of not emptying your bladder?  1 - Less than 1 time in 5   2)  Frequency  How often have you had to urinate less than every two hours? 0 - Not at all   3)  Intermittency  How often have you found you stopped and started again several times when you urinated?  2 - Less than half the time   4) Urgency  How often have you found it difficult to postpone urination?  0 - Not at all   5) Weak Stream  How often have you had a weak urinary stream?  2 - Less than half the time   6) Straining  How often have you had to push or strain to begin urination?  0 - Not  at all   7) Nocturia  How many times did you typically get up at night to urinate?  1 - 1 time   Total Score:  6   The International Prostate Symptom Score (IPSS) is used to screen, diagnose, track symptoms of benign prostatic hyperplasia (BPH).    0-7 pts (Mild Symptoms)  / 8-19 pts (Moderate) / 20-35 (Severe)    Quality of life due to urinary symptoms:  If you were to spend the rest of your life with your urinary condition the way it is now, how would you feel about that? 3-Mixed   Urine Leakage (Incontinence) 0-No Leakage       Recent Image (CT and/or KUB):   CT Abdomen and Pelvis: No results found for this or any previous visit.     CT Stone Protocol: No results found for this or any previous visit.     KUB: No results found for this or any previous visit.       Labs:  Brief Urine Lab Results       None          No visits with results within 3 Month(s) from this visit.   Latest known visit with results is:   Hospital Outpatient Visit on 10/18/2023   Component Date Value Ref Range Status    Creatinine 10/18/2023 0.90  0.60 - 1.30 mg/dL Final    Serial Number: [id number]:  [id number]        Procedure: None  Procedures     I have reviewed and agree with the above PMH, PSH, FMH, social history, medications, allergies, and labs.     Assessment/Plan:   Problem List Items Addressed This Visit       Erectile dysfunction due to arterial insufficiency    Relevant Medications    tadalafil (Cialis) 10 MG tablet    Elevated prostate specific antigen (PSA) - Primary    Relevant Orders    PSA Total+% Free (Serial)     Other Visit Diagnoses       Sinus pressure        Relevant Medications    predniSONE (DELTASONE) 10 MG tablet            Health Maintenance:   Health Maintenance Due   Topic Date Due    BMI FOLLOWUP  Never done    COLORECTAL CANCER SCREENING  Never done    TDAP/TD VACCINES (1 - Tdap) Never done    ZOSTER VACCINE (1 of 2) Never done    RSV Vaccine - Adults (1 - 1-dose 60+ series) Never done    Pneumococcal Vaccine  65+ (1 of 1 - PCV) Never done    COVID-19 Vaccine (1 - 2023-24 season) Never done    HEPATITIS C SCREENING  Never done    ANNUAL WELLNESS VISIT  Never done        Smoking Counseling: Former smoker.  Never used smokeless tobacco.  Counseling given.    Urine Incontinence: Patient reports that he is not currently experiencing any symptoms of urinary incontinence.    Patient was given instructions and counseling regarding his condition or for health maintenance advice. Please see specific information pulled into the AVS if appropriate.    Patient Education:   Elevated PSA -patient does have a persistent elevated PSA of roughly 4.5 with a PI-RADS 2 on MRI.  Discussed the pathophysiology of a PI-RADS 2 including low risk for clinically significant prostate carcinoma.  At this time recommending a repeat PSA free and total in office today.  Advised patient if PSA is increased significantly and number or free percentage is decreased significantly and recommend to proceed forward with an office biopsy.  Discussed the risk and benefits of an office biopsies in detail.  Discussed the use of antibiotics pre and post procedure for infection prophylaxis.  Also discussed the use of Valium and an enema prior to procedure for anxiety and decreased risk of infection.  I will call patient with PSA results once available.  Otherwise we will place him back again in another 6 months.  He verbalized understanding.  Erectile dysfunction -patient was started on tadalafil 5 mg for erectile dysfunction has had minimal improvement.  He also complains of decreased sex drive and fatigue.  Did discuss the use of testosterone test in office today however advised patient he would be best to check lab values between 8 and 10 AM in the morning.  He verbalized understanding and will return to the office to repeat testosterone lab work soon as possible.  Otherwise we will start the patient on tadalafil 10 mg on demand as needed for erectile dysfunction.   Advised him to take half an hour to an hour prior to sexual activity on empty stomach.  Advised him he may increase to 20 mg at 1 time.  Advised him not to exceed 20 mg in 1 hour.  He verbalized understanding.  Sinus pressure -patient does have sinus pressure and yellow drainage.  This is most likely secondary to seasonal allergies and congestion.  I will start the patient on a short prednisone Dosepak.  Advised him to take as prescribed.  Otherwise we will see him back in 6 months pending PSA    Visit Diagnoses:    ICD-10-CM ICD-9-CM   1. Elevated prostate specific antigen (PSA)  R97.20 790.93   2. Erectile dysfunction due to arterial insufficiency  N52.01 607.84   3. Sinus pressure  J34.89 478.19       Meds Ordered During Visit:  New Medications Ordered This Visit   Medications    tadalafil (Cialis) 10 MG tablet     Sig: Take 1 tablet by mouth As Needed for Erectile Dysfunction.     Dispense:  20 tablet     Refill:  1    predniSONE (DELTASONE) 10 MG tablet     Sig: Take 5 tablets by mouth on day one, then 4 tablets on day two, 3 tablets on day three, 2 tablets on day four, then 1 tablet by mouth on day five.     Dispense:  15 tablet     Refill:  0       Follow Up Appointment: 6 months  No follow-ups on file.      This document has been electronically signed by Bossman Amato PA-C   May 3, 2024 15:51 EDT    Part of this note may be an electronic transcription/translation of spoken language to printed text using the Dragon Dictation System.

## 2024-05-06 ENCOUNTER — TELEPHONE (OUTPATIENT)
Dept: UROLOGY | Facility: CLINIC | Age: 69
End: 2024-05-06
Payer: MEDICARE

## 2024-05-06 LAB
PSA FREE MFR SERPL: 7.5 %
PSA FREE SERPL-MCNC: 0.27 NG/ML
PSA SERPL-MCNC: 3.6 NG/ML (ref 0–4)